# Patient Record
Sex: FEMALE | Race: WHITE
[De-identification: names, ages, dates, MRNs, and addresses within clinical notes are randomized per-mention and may not be internally consistent; named-entity substitution may affect disease eponyms.]

---

## 2018-09-01 ENCOUNTER — HEALTH MAINTENANCE LETTER (OUTPATIENT)
Age: 48
End: 2018-09-01

## 2019-10-02 ENCOUNTER — HEALTH MAINTENANCE LETTER (OUTPATIENT)
Age: 49
End: 2019-10-02

## 2020-03-10 ENCOUNTER — OFFICE VISIT (OUTPATIENT)
Dept: FAMILY MEDICINE | Facility: CLINIC | Age: 50
End: 2020-03-10
Payer: COMMERCIAL

## 2020-03-10 VITALS
TEMPERATURE: 98.1 F | WEIGHT: 253.1 LBS | SYSTOLIC BLOOD PRESSURE: 120 MMHG | RESPIRATION RATE: 16 BRPM | DIASTOLIC BLOOD PRESSURE: 79 MMHG | OXYGEN SATURATION: 99 % | HEART RATE: 72 BPM

## 2020-03-10 DIAGNOSIS — M62.830 SPASM OF BACK MUSCLES: ICD-10-CM

## 2020-03-10 DIAGNOSIS — M54.50 ACUTE LEFT-SIDED LOW BACK PAIN WITHOUT SCIATICA: Primary | ICD-10-CM

## 2020-03-10 PROCEDURE — 99214 OFFICE O/P EST MOD 30 MIN: CPT | Performed by: FAMILY MEDICINE

## 2020-03-10 RX ORDER — CYCLOBENZAPRINE HCL 5 MG
5 TABLET ORAL 3 TIMES DAILY PRN
Qty: 30 TABLET | Refills: 0 | Status: SHIPPED | OUTPATIENT
Start: 2020-03-10

## 2020-03-10 NOTE — PROGRESS NOTES
Subjective     Denita Robertson is a 49 year old female who presents to clinic today for the following health issues:    HPI   Back Pain       Duration: 3 weeks        Specific cause: none    Description:   Location of pain: low back left  Character of pain: sharp and stabbing  Pain radiation:radiates into the left buttocks and upper back  New numbness or weakness in legs, not attributed to pain:  no     Intensity: Currently 6/10, moderate, severe    History:   Pain interferes with job: YES, No  History of back problems: no prior back problems  Any previous MRI or X-rays: None  Sees a specialist for back pain:  No  Therapies tried without relief: stretching    Alleviating factors:   Improved by: none      Precipitating factors:  Worsened by: Standing and Sitting        Accompanying Signs & Symptoms:  Risk of Fracture:  None  Risk of Cauda Equina:  None  Risk of Infection:  None  Risk of Cancer:  None  Risk of Ankylosing Spondylitis:  Onset at age <35, male, AND morning back stiffness. no         Pt has Lower left back pain with sitting or standing, aching in nature.  Says she felt something on her back today, like a lump, in left sided mid back.  Reports she is having trouble getting into car, has to hold onto steering wheel for support, has to rest before getting remainder of body into car.  She has been having pain while sleeping, trouble sleeping.  Pain has been going on for about 3 weeks, has been getting worse.  Tried at home:  Stretching, which has not helped, hot showers.  Not tried any other pain relief methods.  No accidents of injuries.  Cannot recall any inciting events.  Nothing similar in past.  No other concerns.      Patient Active Problem List   Diagnosis   (none) - all problems resolved or deleted     Past Surgical History:   Procedure Laterality Date     C NONSPECIFIC PROCEDURE  1994    NVD x 2 pregnancy G2       Social History     Tobacco Use     Smoking status: Never Smoker     Smokeless  tobacco: Never Used   Substance Use Topics     Alcohol use: No     Comment: 1 drink rarely prior to pregancy     Family History   Problem Relation Age of Onset     Blood Disease Father          of leukemia at age 38     Family History Negative Mother      Family History Negative Sister      Family History Negative Sister      Family History Negative Daughter      Family History Negative Daughter      Heart Disease Sister         Carol.  Heart arrythmia (unknown type)         Current Outpatient Medications   Medication Sig Dispense Refill     cyclobenzaprine (FLEXERIL) 5 MG tablet Take 1 tablet (5 mg) by mouth 3 times daily as needed for muscle spasms 30 tablet 0     Allergies   Allergen Reactions     No Known Drug Allergies        Reviewed and updated as needed this visit by Provider  Tobacco  Allergies  Meds  Problems  Med Hx  Surg Hx  Fam Hx  Soc Hx          Review of Systems   ROS COMP: Constitutional, HEENT, cardiovascular, pulmonary, gi and gu systems are negative, except as otherwise noted.      Objective    /79 (BP Location: Right arm, Patient Position: Chair, Cuff Size: Adult Large)   Pulse 72   Temp 98.1  F (36.7  C) (Oral)   Resp 16   Wt 114.8 kg (253 lb 1.6 oz)   LMP 2020 (Exact Date)   SpO2 99%   There is no height or weight on file to calculate BMI.  Physical Exam   GENERAL: healthy, alert and no distress  MS: no gross musculoskeletal defects noted, no edema  BACK: No cervical, thoracic, or lumbar spine tenderness to palpation.  Mild tenderness to palpation of bilateral lumbar paraspinous muscles, left greater than right.  Muscle spasm/knot noted in lateral thoracic paraspinous muscles, tender to palpation.            Assessment & Plan     1. Acute left-sided low back pain without sciatica  Low back pain likely secondary to muscle spasm.  Recommended supportive cares, please see instructions.  Will do trial of Flexeril, 5 mg tablet as needed, particularly at bedtime.   Patient may take 2 tablets at bedtime if needed.  Discussed drowsiness as a side effect, caution with driving or returning to work.  Recommend follow-up in 1 to 2 weeks if not improving, will consider PT referral.  Otherwise follow-up as needed.  - cyclobenzaprine (FLEXERIL) 5 MG tablet; Take 1 tablet (5 mg) by mouth 3 times daily as needed for muscle spasms  Dispense: 30 tablet; Refill: 0    2. Spasm of back muscles  As above in #1.         Patient Instructions   1.  Topical analgesics: BenGay, Biofreeze, Aspercreme, IcyHot    2.  Epsom salt soaks    3.  TENS Unit    4.  Salonpas patches (Lidocaine patches)    5.  Acetaminophen, 650 mg by mouth every 4 hours as needed (Maximum 4000 mg per day)          OR        Ibuprofen, 600 mg by mouth every 6 hours as needed. (Maximum 2400 mg per day)    6.  Heat pad or Ice pack      Patient Education     Back Exercises: Lower Back Stretch    To start, sit in a chair with your feet flat on the floor. Shift your weight slightly forward. Relax, and keep your ears, shoulders, and hips aligned while you do the following:    Sit with your feet well apart.    Bend forward and touch the floor with the backs of your hands. Relax and let your body drop.    Hold for 20 seconds. Return to starting position.    Repeat 2 times.   Date Last Reviewed: 11/1/2017 2000-2019 [employer]. [address] [place] [id number]. All rights reserved. This information is not intended as a substitute for professional medical care. Always follow your healthcare professional's instructions.           Patient Education     Back Exercises: Side Stretch    To start, sit in a chair with your feet flat on the floor. Shift your weight slightly forward to avoid rounding your back. Relax. Keep your ears, shoulders, and hips aligned:    Stretch your right arm overhead.    Slowly bend to the left. Don t twist your torso. Stay within your pain limits.    Hold for 20 seconds. Return to starting  position.    Repeat 2 to 5 times. Then, switch to the other side.  Date Last Reviewed: 3/1/2018    [id number] [employer]. [address] [place] [address]. All rights reserved. This information is not intended as a substitute for professional medical care. Always follow your healthcare professional's instructions.           Patient Education     Lumbar Stretch (Flexibility)    1. Lie on your back on the floor, with your knees bent and your feet flat on the floor. Don t press your neck or lower back to the floor.  2. Pull one knee up toward your chest. Clasp your hands under your thigh to help pull.  3. Hold for 30 to 60 seconds. Lower your leg back down to the floor.  4. Repeat 2 times, or as instructed.  5. Switch legs and repeat.  Date Last Reviewed: 3/10/2016    [id number] [employer]. [address] [place] [address]. All rights reserved. This information is not intended as a substitute for professional medical care. Always follow your healthcare professional's instructions.               Return if symptoms worsen or fail to improve.    Nery George MD  [facility]

## 2020-03-10 NOTE — PATIENT INSTRUCTIONS
1.  Topical analgesics: BenGay, Biofreeze, Aspercreme, IcyHot    2.  Epsom salt soaks    3.  TENS Unit    4.  Salonpas patches (Lidocaine patches)    5.  Acetaminophen, 650 mg by mouth every 4 hours as needed (Maximum 4000 mg per day)          OR        Ibuprofen, 600 mg by mouth every 6 hours as needed. (Maximum 2400 mg per day)    6.  Heat pad or Ice pack      Patient Education     Back Exercises: Lower Back Stretch    To start, sit in a chair with your feet flat on the floor. Shift your weight slightly forward. Relax, and keep your ears, shoulders, and hips aligned while you do the following:    Sit with your feet well apart.    Bend forward and touch the floor with the backs of your hands. Relax and let your body drop.    Hold for 20 seconds. Return to starting position.    Repeat 2 times.   Date Last Reviewed: 11/1/2017 2000-2019 [employer]. [address] [place] [address]. All rights reserved. This information is not intended as a substitute for professional medical care. Always follow your healthcare professional's instructions.           Patient Education     Back Exercises: Side Stretch    To start, sit in a chair with your feet flat on the floor. Shift your weight slightly forward to avoid rounding your back. Relax. Keep your ears, shoulders, and hips aligned:    Stretch your right arm overhead.    Slowly bend to the left. Don t twist your torso. Stay within your pain limits.    Hold for 20 seconds. Return to starting position.    Repeat 2 to 5 times. Then, switch to the other side.  Date Last Reviewed: 3/1/2018    [id number] [employer]. [address] [place] [address]. All rights reserved. This information is not intended as a substitute for professional medical care. Always follow your healthcare professional's instructions.           Patient Education     Lumbar Stretch (Flexibility)    1. Lie on your back on the floor, with your knees bent and your  feet flat on the floor. Don t press your neck or lower back to the floor.  2. Pull one knee up toward your chest. Clasp your hands under your thigh to help pull.  3. Hold for 30 to 60 seconds. Lower your leg back down to the floor.  4. Repeat 2 times, or as instructed.  5. Switch legs and repeat.  Date Last Reviewed: 3/10/2016    [id number] [employer]. [address] [place] [address], [place] [address]. All rights reserved. This information is not intended as a substitute for professional medical care. Always follow your healthcare professional's instructions.

## 2020-04-09 ENCOUNTER — TELEPHONE (OUTPATIENT)
Dept: FAMILY MEDICINE | Facility: CLINIC | Age: 50
End: 2020-04-09

## 2020-04-09 NOTE — TELEPHONE ENCOUNTER
Patient was seen March 10th by Dr Nery George.    Her back pain is gone, but she still has a dry cough and feels a little a tight.    She still has the lump (Mass) on her back.    Denita would like to know what to do now.

## 2020-04-09 NOTE — TELEPHONE ENCOUNTER
Called patient back.  States coughs all night when sleeping and wakes  and states coughs off and on during the day.  Cough started beginning of March.  Has not tried anything for the cough.  This was not discussed at 3/10/20 visit.  Lump is the same as at visit.  Lump does not hurt.  States did hurt when she came in for visit.  Last 2 weeks has not had any pain.  Muscle relaxer did not change the lump.  Muscle relaxer did resolve the back pain.  Discussed PT advised if not improving but due to current situation PT not seeing patients unless urgent.  Scheduled telephone visit for tomorrow at 9 am.  Monica Jones RN

## 2020-04-10 ENCOUNTER — VIRTUAL VISIT (OUTPATIENT)
Dept: FAMILY MEDICINE | Facility: CLINIC | Age: 50
End: 2020-04-10

## 2020-04-10 DIAGNOSIS — R05.9 COUGH: Primary | ICD-10-CM

## 2020-04-10 DIAGNOSIS — R22.9 LOCALIZED SKIN MASS, LUMP, OR SWELLING: ICD-10-CM

## 2020-04-10 PROCEDURE — 99214 OFFICE O/P EST MOD 30 MIN: CPT | Mod: TEL | Performed by: FAMILY MEDICINE

## 2020-04-10 RX ORDER — BENZONATATE 100 MG/1
100 CAPSULE ORAL 3 TIMES DAILY PRN
Qty: 30 CAPSULE | Refills: 0 | Status: SHIPPED | OUTPATIENT
Start: 2020-04-10 | End: 2020-04-24

## 2020-04-10 NOTE — PROGRESS NOTES
"Subjective     Denita Robertson is a 49 year old female who is being evaluated via a billable telephone visit.      The patient has been notified of following:     \"This telephone visit will be conducted via a call between you and your physician/provider. We have found that certain health care needs can be provided without the need for a physical exam.  This service lets us provide the care you need with a short phone conversation.  If a prescription is necessary we can send it directly to your pharmacy.  If lab work is needed we can place an order for that and you can then stop by our lab to have the test done at a later time.    Telephone visits are billed at different rates depending on your insurance coverage. During this emergency period, for some insurers they may be billed the same as an in-person visit.  Please reach out to your insurance provider with any questions.    If during the course of the call the physician/provider feels a telephone visit is not appropriate, you will not be charged for this service.\"    Patient has given verbal consent for Telephone visit?  Yes    How would you like to obtain your AVS? MyChart    Denita Robertson complains of   Chief Complaint   Patient presents with     Cough     x1.5 month     Mass     follow up       ALLERGIES  No known drug allergies    Acute Illness   Acute illness concerns: cough   Onset: 1.5 months    Fever: no    Chills/Sweats: no    Headache (location?): no    Sinus Pressure:no    Conjunctivitis:  no    Ear Pain: YES: left    Rhinorrhea: no    Congestion: no    Sore Throat: no     Cough: YES    Wheeze: no    Decreased Appetite: no     Nausea: no    Vomiting: no    Diarrhea:  no    Dysuria/Freq.: no    Fatigue/Achiness: no     Sick/Strep Exposure: no     Therapies Tried and outcome: nothing     Follow up on mass on back still there, on left side below bra line, doesn't hurt, doesn't wake up her up at night.  Was previously felt to be a muscle knot at " last visit.  Flexeril did nothing to help.    Dry cough, upper part of chest, right below neck and throat feels tight when coughing.  Present since end of February, beginning of march.  Coughing here and there, primarily at night, not waking self up with coughing but wakes her  up.  Not constant cough.   is worried about cough.  No SOB, no wheeze.  Does not feel like there is something stuck in her throat.  Denies nasal congestion or runny nose.  No history of issues with acid reflux of heartburn, no history of asthma or seasonal allergies.  No new medications.          Patient Active Problem List   Diagnosis   (none) - all problems resolved or deleted     Past Surgical History:   Procedure Laterality Date     C NONSPECIFIC PROCEDURE      NVD x 2 pregnancy G2       Social History     Tobacco Use     Smoking status: Never Smoker     Smokeless tobacco: Never Used   Substance Use Topics     Alcohol use: No     Comment: 1 drink rarely prior to pregancy     Family History   Problem Relation Age of Onset     Blood Disease Father          of leukemia at age 38     Family History Negative Mother      Family History Negative Sister      Family History Negative Sister      Family History Negative Daughter      Family History Negative Daughter      Heart Disease Sister         Carol.  Heart arrythmia (unknown type)         Current Outpatient Medications   Medication Sig Dispense Refill     benzonatate (TESSALON) 100 MG capsule Take 1 capsule (100 mg) by mouth 3 times daily as needed for cough 30 capsule 0     cyclobenzaprine (FLEXERIL) 5 MG tablet Take 1 tablet (5 mg) by mouth 3 times daily as needed for muscle spasms 30 tablet 0     Allergies   Allergen Reactions     No Known Drug Allergies        Reviewed and updated as needed this visit by Provider  Tobacco  Allergies  Meds  Problems  Med Hx  Surg Hx  Fam Hx         Review of Systems   ROS COMP: Constitutional, HEENT, cardiovascular, pulmonary,  gi and gu systems are negative, except as otherwise noted.       Assessment/Plan:  1. Cough  Patient has very non-specific cough with multiple possible etiologies.  Will do tessalon perles for symptomatic relief.  We discussed multiple medication trials for stopping the cough, which included antihistamines, PPIs, and inhalers.  Will go ahead a try an antihistamine daily to see if possible post nasal drip is cause of cough, despite limited history of allergies.  Pt is not overly concerned with cough, however her  is dealing with three family members with cancer, one of which is lung cancer and very worried about patient's cough.  We discussed holding off on CXR for now, as she has no real SOB.  Follow up in 2-3 weeks, or sooner as needed.  - benzonatate (TESSALON) 100 MG capsule; Take 1 capsule (100 mg) by mouth 3 times daily as needed for cough  Dispense: 30 capsule; Refill: 0    2. Localized skin mass, lump, or swelling  Lump on back is still present, though not tender or painful as was before.  Was felt to be muscle knot, but patient is unsure now.  Will continue to monitor it, as it is not otherwise bothersome to her currently.  Follow up as needed.      Return in about 3 weeks (around 5/1/2020) for Phone Visit, Recheck Cough.      Phone call duration:  16 minutes    April ALISA George MD

## 2020-04-10 NOTE — PROGRESS NOTES
Called patient, scheduled a 2 week video visit for 04/24/20, with Dr. Kayla George. Ruth Behrens.

## 2020-04-24 ENCOUNTER — VIRTUAL VISIT (OUTPATIENT)
Dept: FAMILY MEDICINE | Facility: CLINIC | Age: 50
End: 2020-04-24

## 2020-04-24 DIAGNOSIS — R22.9 LOCALIZED SKIN MASS, LUMP, OR SWELLING: ICD-10-CM

## 2020-04-24 DIAGNOSIS — M62.830 SPASM OF BACK MUSCLES: ICD-10-CM

## 2020-04-24 DIAGNOSIS — R05.9 COUGH: Primary | ICD-10-CM

## 2020-04-24 PROCEDURE — 99214 OFFICE O/P EST MOD 30 MIN: CPT | Mod: 95 | Performed by: FAMILY MEDICINE

## 2020-04-24 RX ORDER — OMEPRAZOLE 40 MG/1
40 CAPSULE, DELAYED RELEASE ORAL DAILY
Qty: 30 CAPSULE | Refills: 1 | Status: SHIPPED | OUTPATIENT
Start: 2020-04-24

## 2020-04-24 NOTE — PROGRESS NOTES
"Denita Robertson is a 49 year old female who is being evaluated via a billable video visit.      The patient has been notified of following:     \"This video visit will be conducted via a call between you and your physician/provider. We have found that certain health care needs can be provided without the need for an in-person physical exam.  This service lets us provide the care you need with a video conversation.  If a prescription is necessary we can send it directly to your pharmacy.  If lab work is needed we can place an order for that and you can then stop by our lab to have the test done at a later time.    Video visits are billed at different rates depending on your insurance coverage.  Please reach out to your insurance provider with any questions.    If during the course of the call the physician/provider feels a video visit is not appropriate, you will not be charged for this service.\"    Patient has given verbal consent for Video visit? Yes    How would you like to obtain your AVS? [place]    Patient wouldg like the video invitation sent by: Send to e-mail at: [email or web address]    Will anyone else be joining your video visit? No      Subjective     Denita Robertson is a 49 year old female who presents to clinic today for the following health issues:    HPI  Medication Followup of cough and back pain    Taking Medication as prescribed: yes    Side Effects:  None    Medication Helping Symptoms:  Yes, Flexeril has helped back pain but Tessalon not helping with cough     Back is a little stiff from housework yesterday, used cyclobenzaprine and heating pack, which is helpful.  Mass is still present, saw chiropractor and massage, was still present after massage.  Nothing about the mass has changed, tender to palpation.  Has tried aspercreme and heat patches.  Just annoying right now, not interfering with daily activities.    Cough still present, still very sporadic and more like a tickle that makes " "her cough.  Tried tessalon perles, which were not helpful.  Still no SOB, fever, chills, or cold symptoms.  Cough seems to happen more when laying flat or while eating.  Nonproductive cough.       Video Start Time: 8:59 am      Current Outpatient Medications   Medication Sig Dispense Refill     cyclobenzaprine (FLEXERIL) 5 MG tablet Take 1 tablet (5 mg) by mouth 3 times daily as needed for muscle spasms 30 tablet 0     omeprazole (PRILOSEC) 40 MG DR capsule Take 1 capsule (40 mg) by mouth daily 30 capsule 1     Allergies   Allergen Reactions     No Known Drug Allergies        Reviewed and updated as needed this visit by Provider         Review of Systems   ROS COMP: Constitutional, HEENT, cardiovascular, pulmonary, gi and gu systems are negative, except as otherwise noted.      Objective    There were no vitals taken for this visit.  Estimated body mass index is 31.83 kg/m  as calculated from the following:    Height as of 2/17/05: 1.746 m (5' 8.75\").    Weight as of 2/17/05: 97.1 kg (214 lb).  Physical Exam     GENERAL: healthy, alert and no distress  EYES: Eyes grossly normal to inspection, conjunctivae and sclerae normal  RESP: no audible wheeze, cough, or visible cyanosis.  No visible retractions or increased work of breathing.  Able to speak fully in complete sentences.  NEURO: Cranial nerves grossly intact, mentation intact and speech normal  PSYCH: mentation appears normal, affect normal/bright, judgement and insight intact, normal speech and appearance well-groomed  Remainder of exam limited due to video visit      Assessment & Plan     1. Cough  Tessalon perles not helpful, will discontinue.  Pt did not start the zyrtec as planned.  Will recommend she start omeprazole, given her symptoms are worse with lying down and while eating.  Follow up in 3 weeks, or sooner as needed.  - omeprazole (PRILOSEC) 40 MG DR capsule; Take 1 capsule (40 mg) by mouth daily  Dispense: 30 capsule; Refill: 1    2. Localized skin " mass, lump, or swelling  Lump on back area was felt to be muscular in nature, no improvement in pain/discomfort with massage or chiropractics.  At this time, it is not emergent that we find out what this is, and patient is understanding.  She says it is more of an annoyance, but does not interfere with her ability to do her ADLs.  Due to COVID-19, we will likely need to postpone any imaging, as I would like to do an US to see what this is.  In the meantime, we will continue to monitor for any changes.    3. Spasm of back muscles  Continue cyclobenzaprine PRN, massage therapy, heat and ice therapy.  Tylenol or ibuprofen PRN for pain.  Discussed TENS unit with patient.  Will follow up as needed.         There are no Patient Instructions on file for this visit.    Return in about 3 weeks (around 5/15/2020) for Medication Recheck- Omeprazole, cough.    Nery George MD  [facility]      Video-Visit Details    Type of service:  Video Visit    Video End Time:9:16 am    Originating Location (pt. Location): Home    Distant Location (provider location):  [facility]     Mode of Communication:  Video Conference via [employer]    Return in about 3 weeks (around 5/15/2020) for Medication Recheck- Omeprazole, cough.       Nery George MD

## 2020-07-29 ENCOUNTER — ANCILLARY PROCEDURE (OUTPATIENT)
Dept: GENERAL RADIOLOGY | Facility: CLINIC | Age: 50
End: 2020-07-29
Attending: NURSE PRACTITIONER
Payer: COMMERCIAL

## 2020-07-29 ENCOUNTER — OFFICE VISIT (OUTPATIENT)
Dept: FAMILY MEDICINE | Facility: CLINIC | Age: 50
End: 2020-07-29
Payer: COMMERCIAL

## 2020-07-29 VITALS
DIASTOLIC BLOOD PRESSURE: 81 MMHG | HEART RATE: 80 BPM | SYSTOLIC BLOOD PRESSURE: 125 MMHG | WEIGHT: 256 LBS | TEMPERATURE: 98.3 F | OXYGEN SATURATION: 98 % | RESPIRATION RATE: 14 BRPM

## 2020-07-29 DIAGNOSIS — R05.9 COUGH: ICD-10-CM

## 2020-07-29 DIAGNOSIS — R05.9 COUGH: Primary | ICD-10-CM

## 2020-07-29 DIAGNOSIS — M54.6 CHRONIC LEFT-SIDED THORACIC BACK PAIN: ICD-10-CM

## 2020-07-29 DIAGNOSIS — R22.2 MASS ON BACK: ICD-10-CM

## 2020-07-29 DIAGNOSIS — R09.82 PND (POST-NASAL DRIP): ICD-10-CM

## 2020-07-29 DIAGNOSIS — G89.29 CHRONIC LEFT-SIDED THORACIC BACK PAIN: ICD-10-CM

## 2020-07-29 PROCEDURE — 99214 OFFICE O/P EST MOD 30 MIN: CPT | Performed by: NURSE PRACTITIONER

## 2020-07-29 PROCEDURE — 71046 X-RAY EXAM CHEST 2 VIEWS: CPT

## 2020-07-29 RX ORDER — FLUTICASONE PROPIONATE 50 MCG
1 SPRAY, SUSPENSION (ML) NASAL DAILY
Qty: 18.2 ML | Refills: 0 | Status: SHIPPED | OUTPATIENT
Start: 2020-07-29 | End: 2020-09-30

## 2020-07-29 ASSESSMENT — ENCOUNTER SYMPTOMS: COUGH: 1

## 2020-07-29 NOTE — PROGRESS NOTES
Subjective     Denita Robertson is a 49 year old female who presents to clinic today for the following health issues:    Cough     History of Present Illness        Back Pain:  She presents for follow up of back pain. Patient's back pain is a recurring problem.  Location of back pain:  Left lower back  Description of back pain: sharp and shooting  Back pain spreads: nowhere    Since patient first noticed back pain, pain is: always present, but gets better and worse  Does back pain interfere with her job:  Yes      She eats 2-3 servings of fruits and vegetables daily.She consumes 1 sweetened beverage(s) daily.She exercises with enough effort to increase her heart rate 9 or less minutes per day.  She exercises with enough effort to increase her heart rate 3 or less days per week.   She is taking medications regularly.     Acute Illness   Acute illness concerns: cough  Onset: 6+ months    Fever: no    Chills/Sweats: no    Headache (location?): YES- once two weeks ago.     Sinus Pressure:no    Conjunctivitis:  no    Ear Pain: did at onset, resolved now.     Rhinorrhea: YES    Congestion: no     Sore Throat: no      Cough: YES-non-productive    Wheeze: no     Decreased Appetite: YES- decreased since high humidity of weather    Nausea: no    Vomiting: no    Diarrhea:  YES- at onset now resolved.     Dysuria/Freq.: no    Fatigue/Achiness: no    Sick/Strep Exposure: no      Therapies Tried and outcome: Omeprazole, not helpful. Zyrtec, not helpful, benzonatate, not helpful, and reclining at bedtime, not helpful.         Patient Active Problem List   Diagnosis   (none) - all problems resolved or deleted     Past Surgical History:   Procedure Laterality Date     C NONSPECIFIC PROCEDURE  1994    NVD x 2 pregnancy G2       Social History     Tobacco Use     Smoking status: Never Smoker     Smokeless tobacco: Never Used   Substance Use Topics     Alcohol use: No     Comment: 1 drink rarely prior to pregancy     Family History    Problem Relation Age of Onset     Blood Disease Father          of leukemia at age 38     Family History Negative Mother      Family History Negative Sister      Family History Negative Sister      Family History Negative Daughter      Family History Negative Daughter      Heart Disease Sister         Carol.  Heart arrythmia (unknown type)         Current Outpatient Medications   Medication Sig Dispense Refill     cyclobenzaprine (FLEXERIL) 5 MG tablet Take 1 tablet (5 mg) by mouth 3 times daily as needed for muscle spasms 30 tablet 0     fluticasone (FLONASE) 50 MCG/ACT nasal spray Spray 1 spray into both nostrils daily 18.2 mL 0     omeprazole (PRILOSEC) 40 MG DR capsule Take 1 capsule (40 mg) by mouth daily 30 capsule 1     Allergies   Allergen Reactions     No Known Drug Allergies        Reviewed and updated as needed this visit by Provider  Tobacco  Allergies  Meds  Problems  Med Hx  Surg Hx  Fam Hx         Review of Systems   Respiratory: Positive for cough.       Constitutional, HEENT, cardiovascular, pulmonary, gi and gu systems are negative, except as otherwise noted.      Objective    /81 (BP Location: Right arm, Patient Position: Chair, Cuff Size: Adult Regular)   Pulse 80   Temp 98.3  F (36.8  C) (Oral)   Resp 14   Wt 116.1 kg (256 lb)   SpO2 98%   There is no height or weight on file to calculate BMI.  Physical Exam   GENERAL: healthy, alert and no distress  HENT: normal cephalic/atraumatic, both ears: clear effusion, nose and mouth without ulcers or lesions, oropharynx clear and oral mucous membranes moist  NECK: no adenopathy, no asymmetry, masses, or scars and thyroid normal to palpation  RESP: lungs clear to auscultation - no rales, rhonchi or wheezes  CV: regular rate and rhythm, normal S1 S2, no S3 or S4, no murmur, click or rub, no peripheral edema and peripheral pulses strong  ABDOMEN: soft, nontender, no hepatosplenomegaly, no masses and bowel sounds normal  MS: left  lateral thoracic back with soft, immobile approximately 4 cm mass.   SKIN: no suspicious lesions or rashes    Diagnostic Test Results:  Labs reviewed in Epic  CXR - negative        Assessment & Plan     (R05) Cough  (primary encounter diagnosis)  Comment: Chronic. Preliminary X-ray negative. Failed benzonatate, PPI, Zyrtec, and raised HOB. Positive for post nasal drip. Discussed trial of Flonase and possible Blanco Pot. No red flags on exam. Nonsmoker.   Plan: XR Chest 2 Views, fluticasone (FLONASE) 50         MCG/ACT nasal spray            (R22.2) Mass on back  Comment: Soft and immobile. ultrasound to further identify potential mass. Appears benign of exam, however, causing pain in daily activities. Unable to stand for 2+ hour prior to pain. If benign/nonspecific, agreed upon Physical Therapy for back conditioning.   Plan: [facility], [facility]        Limited            (M54.6,  G89.29) Chronic left-sided thoracic back pain  Comment: See above  Plan: [facility]            (R09.82) PND (post-nasal drip)  Comment: See cough. Detailed instruction on use of Flonase  Plan: fluticasone (FLONASE) 50 MCG/ACT nasal spray    I spent greater than 50% of this 30 minute visit with patient face to face for counseling and coordination of care for diagnosis and plan above.                   Patient Instructions   Fluticasone nasal spray: 2 sprays per nostril per day x 1 week, then decrease to one spray per nostril per day. Continue for approximately 4 weeks. Please follow-up for follow-up.     ultrasound for back mass. Progress with Physical Therapy if appear like muscular pain.     Follow-up as planned; sooner with any questions, concerns, or worsening symptoms.         Return in about 4 weeks (around 8/26/2020) for Video Visit.    Mary Anne Munoz NP  [facility]

## 2020-07-29 NOTE — PATIENT INSTRUCTIONS
Fluticasone nasal spray: 2 sprays per nostril per day x 1 week, then decrease to one spray per nostril per day. Continue for approximately 4 weeks. Please follow-up for follow-up.     ultrasound for back mass. Progress with Physical Therapy if appear like muscular pain.     Follow-up as planned; sooner with any questions, concerns, or worsening symptoms.

## 2020-07-30 ENCOUNTER — TELEPHONE (OUTPATIENT)
Dept: FAMILY MEDICINE | Facility: CLINIC | Age: 50
End: 2020-07-30

## 2020-08-10 ENCOUNTER — HOSPITAL ENCOUNTER (OUTPATIENT)
Dept: ULTRASOUND IMAGING | Facility: CLINIC | Age: 50
Discharge: HOME | End: 2020-08-10
Attending: NURSE PRACTITIONER | Admitting: NURSE PRACTITIONER
Payer: COMMERCIAL

## 2020-08-10 DIAGNOSIS — R22.2 MASS ON BACK: ICD-10-CM

## 2020-08-10 PROCEDURE — 76882 US LMTD JT/FCL EVL NVASC XTR: CPT

## 2020-09-30 DIAGNOSIS — R05.9 COUGH: ICD-10-CM

## 2020-09-30 DIAGNOSIS — R09.82 PND (POST-NASAL DRIP): ICD-10-CM

## 2020-09-30 RX ORDER — FLUTICASONE PROPIONATE 50 MCG
1 SPRAY, SUSPENSION (ML) NASAL DAILY
Qty: 18.2 ML | Refills: 0 | Status: SHIPPED | OUTPATIENT
Start: 2020-09-30

## 2020-12-22 ENCOUNTER — MYC MEDICAL ADVICE (OUTPATIENT)
Dept: FAMILY MEDICINE | Facility: CLINIC | Age: 50
End: 2020-12-22

## 2021-01-15 ENCOUNTER — HEALTH MAINTENANCE LETTER (OUTPATIENT)
Age: 51
End: 2021-01-15

## 2021-01-24 ENCOUNTER — HEALTH MAINTENANCE LETTER (OUTPATIENT)
Age: 51
End: 2021-01-24

## 2021-09-04 ENCOUNTER — HEALTH MAINTENANCE LETTER (OUTPATIENT)
Age: 51
End: 2021-09-04

## 2022-02-19 ENCOUNTER — HEALTH MAINTENANCE LETTER (OUTPATIENT)
Age: 52
End: 2022-02-19

## 2022-06-30 ENCOUNTER — ANCILLARY PROCEDURE (OUTPATIENT)
Dept: GENERAL RADIOLOGY | Facility: CLINIC | Age: 52
End: 2022-06-30
Attending: FAMILY MEDICINE
Payer: COMMERCIAL

## 2022-06-30 ENCOUNTER — OFFICE VISIT (OUTPATIENT)
Dept: FAMILY MEDICINE | Facility: CLINIC | Age: 52
End: 2022-06-30

## 2022-06-30 VITALS
TEMPERATURE: 97.6 F | SYSTOLIC BLOOD PRESSURE: 121 MMHG | RESPIRATION RATE: 20 BRPM | HEIGHT: 69 IN | OXYGEN SATURATION: 96 % | HEART RATE: 79 BPM | DIASTOLIC BLOOD PRESSURE: 73 MMHG | BODY MASS INDEX: 37.92 KG/M2 | WEIGHT: 256 LBS

## 2022-06-30 DIAGNOSIS — J18.9 COMMUNITY ACQUIRED PNEUMONIA, UNSPECIFIED LATERALITY: ICD-10-CM

## 2022-06-30 DIAGNOSIS — R05.9 COUGH: Primary | ICD-10-CM

## 2022-06-30 PROCEDURE — 99213 OFFICE O/P EST LOW 20 MIN: CPT | Performed by: FAMILY MEDICINE

## 2022-06-30 PROCEDURE — 71046 X-RAY EXAM CHEST 2 VIEWS: CPT | Mod: TC | Performed by: RADIOLOGY

## 2022-06-30 RX ORDER — GUAIFENESIN/DEXTROMETHORPHAN 100-10MG/5
10 SYRUP ORAL EVERY 4 HOURS PRN
Qty: 118 ML | Refills: 0 | Status: SHIPPED | OUTPATIENT
Start: 2022-06-30

## 2022-06-30 RX ORDER — AZITHROMYCIN 250 MG/1
TABLET, FILM COATED ORAL
Qty: 6 TABLET | Refills: 0 | Status: SHIPPED | OUTPATIENT
Start: 2022-06-30 | End: 2022-07-05

## 2022-06-30 ASSESSMENT — ENCOUNTER SYMPTOMS
COUGH: 1
SINUS PAIN: 1
RHINORRHEA: 1
SINUS PRESSURE: 1
DIFFICULTY URINATING: 1
PALPITATIONS: 0

## 2022-06-30 NOTE — PROGRESS NOTES
"  Assessment & Plan     Cough  - XR Chest 2 Views  Positive Covid 10 days ago .    Community acquired pneumonia, unspecified laterality  - on exam crackles left lung base     - amoxicillin-clavulanate (AUGMENTIN) 875-125 MG tablet; Take 1 tablet by mouth 2 times daily  - guaiFENesin-dextromethorphan (ROBITUSSIN DM) 100-10 MG/5ML syrup; Take 10 mLs by mouth every 4 hours as needed for cough  - azithromycin (ZITHROMAX) 250 MG tablet; Take 2 tablets (500 mg) by mouth daily for 1 day, THEN 1 tablet (250 mg) daily for 4 days.         BMI:   Estimated body mass index is 38.08 kg/m  as calculated from the following:    Height as of this encounter: 1.746 m (5' 8.75\").    Weight as of this encounter: 116.1 kg (256 lb).   Weight management plan: Discussed healthy diet and exercise guidelines      Return in about 4 weeks (around 7/28/2022) for Routine preventive, patient will call.    Kelly Dixon MD  [facility] Barger is a 51 year old, presenting for the following health issues:  Cough (Tested positive for Covid on 06/24/2022, c/o cough and chest congestion)      Cough  Associated symptoms include chest pain and rhinorrhea.   History of Present Illness       Reason for visit:  Cough    She eats 0-1 servings of fruits and vegetables daily.She consumes 1 sweetened beverage(s) daily.She exercises with enough effort to increase her heart rate 10 to 19 minutes per day.  She exercises with enough effort to increase her heart rate 3 or less days per week.   She is taking medications regularly.         Review of Systems   HENT: Positive for congestion, postnasal drip, rhinorrhea, sinus pressure and sinus pain.    Respiratory: Positive for cough.    Cardiovascular: Positive for chest pain. Negative for palpitations and peripheral edema.   Genitourinary: Positive for difficulty urinating.        Objective    /73 (BP Location: Right arm, Patient Position: Chair, Cuff Size: Adult Large)   " "Pulse 79   Temp 97.6  F (36.4  C) (Oral)   Resp 20   Ht 1.746 m (5' 8.75\")   Wt 116.1 kg (256 lb)   SpO2 96%   Breastfeeding No   BMI 38.08 kg/m    Body mass index is 38.08 kg/m .  Physical Exam   GENERAL: healthy, alert and no distress  RESP: lungs clear to auscultation - no rales, rhonchi or wheezes  CV: regular rate and rhythm, normal S1 S2, no S3 or S4, no murmur, click or rub, no peripheral edema and peripheral pulses strong  ABDOMEN: soft, nontender, no hepatosplenomegaly, no masses and bowel sounds normal  MS: no gross musculoskeletal defects noted, no edema  SKIN: no suspicious lesions or rashes  NEURO: Normal strength and tone, mentation intact and speech normal  PSYCH: mentation appears normal, affect normal/bright                .  ..  "

## 2022-10-22 ENCOUNTER — HEALTH MAINTENANCE LETTER (OUTPATIENT)
Age: 52
End: 2022-10-22

## 2022-12-08 ENCOUNTER — TELEPHONE (OUTPATIENT)
Dept: FAMILY MEDICINE | Facility: CLINIC | Age: 52
End: 2022-12-08

## 2022-12-08 NOTE — TELEPHONE ENCOUNTER
Summary:    Patient is due/failing the following:   PAP    Reviewed:    [] CARE EVERYWHERE  [] LAST OV NOTE   [] FYI TAB  [] MYCHART ACTIVE?  [] LAST PANEL ENCOUNTER  [] FUTURE APPTS  [] IMMUNIZATIONS  [] Media Tab            Action needed:   Patient needs office visit for pap.    Type of outreach:    Sent [employer] message.                                                                               Laurel Recinos/IGLESIA  Esko---[facility]

## 2023-04-01 ENCOUNTER — HEALTH MAINTENANCE LETTER (OUTPATIENT)
Age: 53
End: 2023-04-01

## 2024-06-02 ENCOUNTER — HEALTH MAINTENANCE LETTER (OUTPATIENT)
Age: 54
End: 2024-06-02

## 2025-04-12 ENCOUNTER — HEALTH MAINTENANCE LETTER (OUTPATIENT)
Age: 55
End: 2025-04-12

## 2025-06-14 ENCOUNTER — HEALTH MAINTENANCE LETTER (OUTPATIENT)
Age: 55
End: 2025-06-14